# Patient Record
Sex: MALE | Race: BLACK OR AFRICAN AMERICAN | Employment: FULL TIME | URBAN - METROPOLITAN AREA
[De-identification: names, ages, dates, MRNs, and addresses within clinical notes are randomized per-mention and may not be internally consistent; named-entity substitution may affect disease eponyms.]

---

## 2018-11-23 NOTE — PERIOP NOTES
PAT - SURGICAL PRE-ADMISSION INSTRUCTIONS 
 
NAME:  Nabila Goodson ELADIO Villa DATE:  11/23/2018 SURGERY DATE:  11/27/2018                                  SURGERY ARRIVAL TIME:   1000 1. Do NOT eat or drink anything, including candy or gum, after MIDNIGHT on 11/26/18 , unless you have specific instructions from your Surgeon or Anesthesia Provider to do so. 2. No smoking on the day of surgery. 3. No alcohol 24 hours prior to the day of surgery. 4. No recreational drugs for one week prior to the day of surgery. 5. Leave all valuables, including money/purse, at home. 6. Remove all jewelry, nail polish, makeup (including mascara); no lotions, powders, deodorant, or perfume/cologne/after shave. 7. Glasses/Contact lenses and Dentures may be worn to the hospital.  They will be removed prior to surgery. 8. Call your doctor if symptoms of a cold or illness develop within 24 ours prior to surgery. 9. AN ADULT MUST DRIVE YOU HOME AFTER OUTPATIENT SURGERY. 10. If you are having an OUTPATIENT procedure, please make arrangements for a responsible adult to be with you for 24 hours after your surgery. 11. If you are admitted to the hospital, you will be assigned to a bed after surgery is complete. Normally a family member will not be able to see you until you are in your assigned bed. 15. Family is encouraged to accompany you to the hospital.  We ask visitors in the treatment area to be limited to ONE person at a time to ensure patient privacy. EXCEPTIONS WILL BE MADE AS NEEDED. 15. Children under 12 are discouraged from entering the treatment area and need to be supervised by an adult when in the waiting room. Special Instructions: Take these medications the morning of surgery with a sip of water:  Losartan Patient Prep: 
 
shower with anti-bacterial soap These surgical instructions were reviewed with Nabila Goodson during the PAT phone call. Directions: On the morning of surgery, please go to the [facility]. Enter the building from the [facility] entrance, 1st floor (next to the Emergency Room entrance). Take the elevator to the 2nd floor. Sign in at the Registration Desk. If you have any questions and/or concerns, please do not hesitate to call: 
(Prior to the day of surgery)  [facility] unit:  [phone] (Day of surgery)  [facility] unit:  [phone]

## 2018-11-26 ENCOUNTER — ANESTHESIA EVENT (OUTPATIENT)
Dept: SURGERY | Age: 68
End: 2018-11-26
Payer: COMMERCIAL

## 2018-11-27 ENCOUNTER — HOSPITAL ENCOUNTER (OUTPATIENT)
Age: 68
Setting detail: OBSERVATION
Discharge: HOME | End: 2018-11-28
Attending: UROLOGY | Admitting: STUDENT IN AN ORGANIZED HEALTH CARE EDUCATION/TRAINING PROGRAM
Payer: COMMERCIAL

## 2018-11-27 ENCOUNTER — ANESTHESIA (OUTPATIENT)
Dept: SURGERY | Age: 68
End: 2018-11-27
Payer: COMMERCIAL

## 2018-11-27 DIAGNOSIS — N52.8 OTHER MALE ERECTILE DYSFUNCTION: Primary | ICD-10-CM

## 2018-11-27 PROCEDURE — 77030034850: Performed by: UROLOGY

## 2018-11-27 PROCEDURE — 77030018673: Performed by: UROLOGY

## 2018-11-27 PROCEDURE — 74011250636 HC RX REV CODE- 250/636: Performed by: UROLOGY

## 2018-11-27 PROCEDURE — 77030013079 HC BLNKT BAIR HGGR 3M -A: Performed by: NURSE ANESTHETIST, CERTIFIED REGISTERED

## 2018-11-27 PROCEDURE — 77030018823 HC SLV COMPR VENO -B: Performed by: UROLOGY

## 2018-11-27 PROCEDURE — 77030034696 HC CATH URETH FOL 2W BARD -A: Performed by: UROLOGY

## 2018-11-27 PROCEDURE — 74011250636 HC RX REV CODE- 250/636: Performed by: NURSE ANESTHETIST, CERTIFIED REGISTERED

## 2018-11-27 PROCEDURE — 77030020263 HC SOL INJ SOD CL0.9% LFCR 1000ML: Performed by: UROLOGY

## 2018-11-27 PROCEDURE — 74011250637 HC RX REV CODE- 250/637: Performed by: NURSE ANESTHETIST, CERTIFIED REGISTERED

## 2018-11-27 PROCEDURE — 77030010507 HC ADH SKN DERMBND J&J -B: Performed by: UROLOGY

## 2018-11-27 PROCEDURE — 74011000250 HC RX REV CODE- 250: Performed by: UROLOGY

## 2018-11-27 PROCEDURE — 77030002966 HC SUT PDS J&J -A: Performed by: UROLOGY

## 2018-11-27 PROCEDURE — C1813 PROSTHESIS, PENILE, INFLATAB: HCPCS | Performed by: UROLOGY

## 2018-11-27 PROCEDURE — 77030031139 HC SUT VCRL2 J&J -A: Performed by: UROLOGY

## 2018-11-27 PROCEDURE — 99218 HC RM OBSERVATION: CPT

## 2018-11-27 PROCEDURE — 77030014008 HC SPNG HEMSTAT J&J -C: Performed by: UROLOGY

## 2018-11-27 PROCEDURE — 77030012961 HC IRR KT CYSTO/TUR ICUM -A: Performed by: UROLOGY

## 2018-11-27 PROCEDURE — 76010000171 HC OR TIME 2 TO 2.5 HR INTENSV-TIER 1: Performed by: UROLOGY

## 2018-11-27 PROCEDURE — 74011250636 HC RX REV CODE- 250/636

## 2018-11-27 PROCEDURE — 77030018390 HC SPNG HEMSTAT2 J&J -B: Performed by: UROLOGY

## 2018-11-27 PROCEDURE — 77030011265 HC ELECTRD BLD HEX COVD -A: Performed by: UROLOGY

## 2018-11-27 PROCEDURE — 77030020229 HC RETRCTR SCROT SYS BSC -D: Performed by: UROLOGY

## 2018-11-27 PROCEDURE — 74011250637 HC RX REV CODE- 250/637: Performed by: STUDENT IN AN ORGANIZED HEALTH CARE EDUCATION/TRAINING PROGRAM

## 2018-11-27 PROCEDURE — 74011000250 HC RX REV CODE- 250

## 2018-11-27 PROCEDURE — 74011250636 HC RX REV CODE- 250/636: Performed by: STUDENT IN AN ORGANIZED HEALTH CARE EDUCATION/TRAINING PROGRAM

## 2018-11-27 PROCEDURE — 76210000016 HC OR PH I REC 1 TO 1.5 HR: Performed by: UROLOGY

## 2018-11-27 PROCEDURE — 74011000258 HC RX REV CODE- 258

## 2018-11-27 PROCEDURE — 74011000258 HC RX REV CODE- 258: Performed by: UROLOGY

## 2018-11-27 PROCEDURE — 77030012510 HC MSK AIRWY LMA TELE -B: Performed by: NURSE ANESTHETIST, CERTIFIED REGISTERED

## 2018-11-27 PROCEDURE — 76060000035 HC ANESTHESIA 2 TO 2.5 HR: Performed by: UROLOGY

## 2018-11-27 RX ORDER — MORPHINE SULFATE 4 MG/ML
2 INJECTION INTRAVENOUS
Status: DISCONTINUED | OUTPATIENT
Start: 2018-11-27 | End: 2018-11-28 | Stop reason: HOSPADM

## 2018-11-27 RX ORDER — MIDAZOLAM HYDROCHLORIDE 1 MG/ML
INJECTION, SOLUTION INTRAMUSCULAR; INTRAVENOUS AS NEEDED
Status: DISCONTINUED | OUTPATIENT
Start: 2018-11-27 | End: 2018-11-27 | Stop reason: HOSPADM

## 2018-11-27 RX ORDER — DIPHENHYDRAMINE HYDROCHLORIDE 50 MG/ML
12.5 INJECTION, SOLUTION INTRAMUSCULAR; INTRAVENOUS
Status: DISCONTINUED | OUTPATIENT
Start: 2018-11-27 | End: 2018-11-28 | Stop reason: HOSPADM

## 2018-11-27 RX ORDER — PROPOFOL 10 MG/ML
INJECTION, EMULSION INTRAVENOUS AS NEEDED
Status: DISCONTINUED | OUTPATIENT
Start: 2018-11-27 | End: 2018-11-27 | Stop reason: HOSPADM

## 2018-11-27 RX ORDER — LIDOCAINE HYDROCHLORIDE 20 MG/ML
INJECTION, SOLUTION EPIDURAL; INFILTRATION; INTRACAUDAL; PERINEURAL AS NEEDED
Status: DISCONTINUED | OUTPATIENT
Start: 2018-11-27 | End: 2018-11-27 | Stop reason: HOSPADM

## 2018-11-27 RX ORDER — NEOMYCIN AND POLYMYXIN B SULFATES 40; 200000 MG/ML; [USP'U]/ML
SOLUTION IRRIGATION AS NEEDED
Status: DISCONTINUED | OUTPATIENT
Start: 2018-11-27 | End: 2018-11-27 | Stop reason: HOSPADM

## 2018-11-27 RX ORDER — SODIUM CHLORIDE, SODIUM LACTATE, POTASSIUM CHLORIDE, CALCIUM CHLORIDE 600; 310; 30; 20 MG/100ML; MG/100ML; MG/100ML; MG/100ML
25 INJECTION, SOLUTION INTRAVENOUS CONTINUOUS
Status: DISCONTINUED | OUTPATIENT
Start: 2018-11-27 | End: 2018-11-27 | Stop reason: HOSPADM

## 2018-11-27 RX ORDER — SODIUM CHLORIDE, SODIUM LACTATE, POTASSIUM CHLORIDE, CALCIUM CHLORIDE 600; 310; 30; 20 MG/100ML; MG/100ML; MG/100ML; MG/100ML
INJECTION, SOLUTION INTRAVENOUS
Status: DISCONTINUED | OUTPATIENT
Start: 2018-11-27 | End: 2018-11-27 | Stop reason: HOSPADM

## 2018-11-27 RX ORDER — SODIUM CHLORIDE 0.9 % (FLUSH) 0.9 %
5-10 SYRINGE (ML) INJECTION EVERY 8 HOURS
Status: DISCONTINUED | OUTPATIENT
Start: 2018-11-27 | End: 2018-11-28 | Stop reason: HOSPADM

## 2018-11-27 RX ORDER — SULFAMETHOXAZOLE AND TRIMETHOPRIM 800; 160 MG/1; MG/1
1 TABLET ORAL 2 TIMES DAILY
Qty: 20 TAB | Refills: 0 | Status: SHIPPED | OUTPATIENT
Start: 2018-11-27 | End: 2018-11-27 | Stop reason: SDUPTHER

## 2018-11-27 RX ORDER — ATORVASTATIN CALCIUM 10 MG/1
40 TABLET, FILM COATED ORAL DAILY
Status: DISCONTINUED | OUTPATIENT
Start: 2018-11-28 | End: 2018-11-28 | Stop reason: HOSPADM

## 2018-11-27 RX ORDER — INSULIN LISPRO 100 [IU]/ML
INJECTION, SOLUTION INTRAVENOUS; SUBCUTANEOUS ONCE
Status: DISCONTINUED | OUTPATIENT
Start: 2018-11-27 | End: 2018-11-27 | Stop reason: HOSPADM

## 2018-11-27 RX ORDER — DEXAMETHASONE SODIUM PHOSPHATE 4 MG/ML
INJECTION, SOLUTION INTRA-ARTICULAR; INTRALESIONAL; INTRAMUSCULAR; INTRAVENOUS; SOFT TISSUE AS NEEDED
Status: DISCONTINUED | OUTPATIENT
Start: 2018-11-27 | End: 2018-11-27 | Stop reason: HOSPADM

## 2018-11-27 RX ORDER — VANCOMYCIN 2 GRAM/500 ML IN 0.9 % SODIUM CHLORIDE INTRAVENOUS
2000 ONCE
Status: COMPLETED | OUTPATIENT
Start: 2018-11-27 | End: 2018-11-27

## 2018-11-27 RX ORDER — SULFAMETHOXAZOLE AND TRIMETHOPRIM 800; 160 MG/1; MG/1
1 TABLET ORAL 2 TIMES DAILY
Qty: 20 TAB | Refills: 0 | Status: SHIPPED | OUTPATIENT
Start: 2018-11-27 | End: 2018-12-05

## 2018-11-27 RX ORDER — DEXTROSE MONOHYDRATE 25 G/50ML
25-50 INJECTION, SOLUTION INTRAVENOUS AS NEEDED
Status: DISCONTINUED | OUTPATIENT
Start: 2018-11-27 | End: 2018-11-27 | Stop reason: HOSPADM

## 2018-11-27 RX ORDER — HEPARIN SODIUM 5000 [USP'U]/ML
5000 INJECTION, SOLUTION INTRAVENOUS; SUBCUTANEOUS EVERY 8 HOURS
Status: DISCONTINUED | OUTPATIENT
Start: 2018-11-27 | End: 2018-11-28 | Stop reason: HOSPADM

## 2018-11-27 RX ORDER — OXYCODONE AND ACETAMINOPHEN 5; 325 MG/1; MG/1
1 TABLET ORAL
Status: DISCONTINUED | OUTPATIENT
Start: 2018-11-27 | End: 2018-11-28 | Stop reason: HOSPADM

## 2018-11-27 RX ORDER — ONDANSETRON 2 MG/ML
INJECTION INTRAMUSCULAR; INTRAVENOUS AS NEEDED
Status: DISCONTINUED | OUTPATIENT
Start: 2018-11-27 | End: 2018-11-27 | Stop reason: HOSPADM

## 2018-11-27 RX ORDER — SODIUM CHLORIDE 0.9 % (FLUSH) 0.9 %
5-10 SYRINGE (ML) INJECTION AS NEEDED
Status: DISCONTINUED | OUTPATIENT
Start: 2018-11-27 | End: 2018-11-27 | Stop reason: HOSPADM

## 2018-11-27 RX ORDER — SODIUM CHLORIDE 0.9 % (FLUSH) 0.9 %
5-10 SYRINGE (ML) INJECTION AS NEEDED
Status: DISCONTINUED | OUTPATIENT
Start: 2018-11-27 | End: 2018-11-28 | Stop reason: HOSPADM

## 2018-11-27 RX ORDER — SODIUM CHLORIDE, SODIUM LACTATE, POTASSIUM CHLORIDE, CALCIUM CHLORIDE 600; 310; 30; 20 MG/100ML; MG/100ML; MG/100ML; MG/100ML
50 INJECTION, SOLUTION INTRAVENOUS CONTINUOUS
Status: DISCONTINUED | OUTPATIENT
Start: 2018-11-27 | End: 2018-11-27 | Stop reason: HOSPADM

## 2018-11-27 RX ORDER — LABETALOL HCL 20 MG/4 ML
5 SYRINGE (ML) INTRAVENOUS
Status: DISCONTINUED | OUTPATIENT
Start: 2018-11-27 | End: 2018-11-27 | Stop reason: HOSPADM

## 2018-11-27 RX ORDER — DOCUSATE SODIUM 100 MG/1
100 CAPSULE, LIQUID FILLED ORAL 2 TIMES DAILY
Status: DISCONTINUED | OUTPATIENT
Start: 2018-11-27 | End: 2018-11-28 | Stop reason: HOSPADM

## 2018-11-27 RX ORDER — FENTANYL CITRATE 50 UG/ML
INJECTION, SOLUTION INTRAMUSCULAR; INTRAVENOUS AS NEEDED
Status: DISCONTINUED | OUTPATIENT
Start: 2018-11-27 | End: 2018-11-27 | Stop reason: HOSPADM

## 2018-11-27 RX ORDER — OXYCODONE AND ACETAMINOPHEN 5; 325 MG/1; MG/1
1 TABLET ORAL
Qty: 16 TAB | Refills: 0 | Status: SHIPPED | OUTPATIENT
Start: 2018-11-27 | End: 2018-12-05

## 2018-11-27 RX ORDER — OXYCODONE AND ACETAMINOPHEN 5; 325 MG/1; MG/1
1-2 TABLET ORAL
Status: DISCONTINUED | OUTPATIENT
Start: 2018-11-27 | End: 2018-11-27 | Stop reason: HOSPADM

## 2018-11-27 RX ORDER — ONDANSETRON 2 MG/ML
4 INJECTION INTRAMUSCULAR; INTRAVENOUS
Status: DISCONTINUED | OUTPATIENT
Start: 2018-11-27 | End: 2018-11-28 | Stop reason: HOSPADM

## 2018-11-27 RX ORDER — EPHEDRINE SULFATE 50 MG/ML
INJECTION, SOLUTION INTRAVENOUS AS NEEDED
Status: DISCONTINUED | OUTPATIENT
Start: 2018-11-27 | End: 2018-11-27 | Stop reason: HOSPADM

## 2018-11-27 RX ORDER — FAMOTIDINE 20 MG/1
20 TABLET, FILM COATED ORAL ONCE
Status: COMPLETED | OUTPATIENT
Start: 2018-11-27 | End: 2018-11-27

## 2018-11-27 RX ORDER — ONDANSETRON 2 MG/ML
4 INJECTION INTRAMUSCULAR; INTRAVENOUS
Status: DISCONTINUED | OUTPATIENT
Start: 2018-11-27 | End: 2018-11-27 | Stop reason: HOSPADM

## 2018-11-27 RX ORDER — LOSARTAN POTASSIUM 50 MG/1
100 TABLET ORAL DAILY
Status: DISCONTINUED | OUTPATIENT
Start: 2018-11-28 | End: 2018-11-28 | Stop reason: HOSPADM

## 2018-11-27 RX ORDER — FENTANYL CITRATE 50 UG/ML
50 INJECTION, SOLUTION INTRAMUSCULAR; INTRAVENOUS
Status: COMPLETED | OUTPATIENT
Start: 2018-11-27 | End: 2018-11-27

## 2018-11-27 RX ORDER — MORPHINE SULFATE 4 MG/ML
2-4 INJECTION INTRAVENOUS
Status: DISCONTINUED | OUTPATIENT
Start: 2018-11-27 | End: 2018-11-27 | Stop reason: HOSPADM

## 2018-11-27 RX ORDER — DOCUSATE SODIUM 100 MG/1
100 CAPSULE, LIQUID FILLED ORAL 2 TIMES DAILY
Qty: 40 CAP | Refills: 0 | Status: SHIPPED | OUTPATIENT
Start: 2018-11-27 | End: 2018-12-05

## 2018-11-27 RX ORDER — MAGNESIUM SULFATE 100 %
4 CRYSTALS MISCELLANEOUS AS NEEDED
Status: DISCONTINUED | OUTPATIENT
Start: 2018-11-27 | End: 2018-11-27 | Stop reason: HOSPADM

## 2018-11-27 RX ORDER — VANCOMYCIN/0.9 % SOD CHLORIDE 1.5G/250ML
1500 PLASTIC BAG, INJECTION (ML) INTRAVENOUS EVERY 12 HOURS
Status: DISCONTINUED | OUTPATIENT
Start: 2018-11-28 | End: 2018-11-28 | Stop reason: HOSPADM

## 2018-11-27 RX ADMIN — FENTANYL CITRATE 25 MCG: 50 INJECTION, SOLUTION INTRAMUSCULAR; INTRAVENOUS at 13:48

## 2018-11-27 RX ADMIN — Medication 10 ML: at 16:09

## 2018-11-27 RX ADMIN — PROPOFOL 40 MG: 10 INJECTION, EMULSION INTRAVENOUS at 13:48

## 2018-11-27 RX ADMIN — FENTANYL CITRATE 25 MCG: 50 INJECTION, SOLUTION INTRAMUSCULAR; INTRAVENOUS at 13:46

## 2018-11-27 RX ADMIN — SODIUM CHLORIDE, SODIUM LACTATE, POTASSIUM CHLORIDE, CALCIUM CHLORIDE: 600; 310; 30; 20 INJECTION, SOLUTION INTRAVENOUS at 13:23

## 2018-11-27 RX ADMIN — EPHEDRINE SULFATE 5 MG: 50 INJECTION, SOLUTION INTRAVENOUS at 14:06

## 2018-11-27 RX ADMIN — SODIUM CHLORIDE, SODIUM LACTATE, POTASSIUM CHLORIDE, AND CALCIUM CHLORIDE 50 ML/HR: 600; 310; 30; 20 INJECTION, SOLUTION INTRAVENOUS at 10:45

## 2018-11-27 RX ADMIN — PROPOFOL 50 MG: 10 INJECTION, EMULSION INTRAVENOUS at 13:22

## 2018-11-27 RX ADMIN — VANCOMYCIN HYDROCHLORIDE 2000 MG: 10 INJECTION, POWDER, LYOPHILIZED, FOR SOLUTION INTRAVENOUS at 12:25

## 2018-11-27 RX ADMIN — FENTANYL CITRATE 100 MCG: 50 INJECTION, SOLUTION INTRAMUSCULAR; INTRAVENOUS at 13:19

## 2018-11-27 RX ADMIN — LIDOCAINE HYDROCHLORIDE 50 MG: 20 INJECTION, SOLUTION EPIDURAL; INFILTRATION; INTRACAUDAL; PERINEURAL at 13:19

## 2018-11-27 RX ADMIN — HEPARIN SODIUM 5000 UNITS: 5000 INJECTION INTRAVENOUS; SUBCUTANEOUS at 16:11

## 2018-11-27 RX ADMIN — FENTANYL CITRATE 50 MCG: 50 INJECTION, SOLUTION INTRAMUSCULAR; INTRAVENOUS at 15:55

## 2018-11-27 RX ADMIN — DEXAMETHASONE SODIUM PHOSPHATE 8 MG: 4 INJECTION, SOLUTION INTRA-ARTICULAR; INTRALESIONAL; INTRAMUSCULAR; INTRAVENOUS; SOFT TISSUE at 13:34

## 2018-11-27 RX ADMIN — FAMOTIDINE 20 MG: 20 TABLET ORAL at 10:22

## 2018-11-27 RX ADMIN — DOCUSATE SODIUM 100 MG: 100 CAPSULE, LIQUID FILLED ORAL at 18:08

## 2018-11-27 RX ADMIN — PROPOFOL 150 MG: 10 INJECTION, EMULSION INTRAVENOUS at 13:21

## 2018-11-27 RX ADMIN — ONDANSETRON 4 MG: 2 INJECTION INTRAMUSCULAR; INTRAVENOUS at 15:05

## 2018-11-27 RX ADMIN — FENTANYL CITRATE 50 MCG: 50 INJECTION, SOLUTION INTRAMUSCULAR; INTRAVENOUS at 13:58

## 2018-11-27 RX ADMIN — MIDAZOLAM HYDROCHLORIDE 2 MG: 1 INJECTION, SOLUTION INTRAMUSCULAR; INTRAVENOUS at 13:13

## 2018-11-27 RX ADMIN — TOBRAMYCIN SULFATE 388 MG: 40 INJECTION, SOLUTION INTRAMUSCULAR; INTRAVENOUS at 13:27

## 2018-11-27 RX ADMIN — FENTANYL CITRATE 50 MCG: 50 INJECTION, SOLUTION INTRAMUSCULAR; INTRAVENOUS at 15:45

## 2018-11-27 NOTE — OP NOTES
Operative Note    11/27/2018    Patient: Terry Lara               Sex: Male             MRN: [id number]      YOB: 1950      Age: 79              Preoperative Diagnosis: Erectile dysfunction. Postoperative Diagnosis:  Erectile dysfunction. Surgeon: Eusebio Ingram MD    Assistant:  Cat Vazquez MD [address] [place] [address] Fellow    Diana Mirza MD RES PGY3    Anesthesia:     Indications for surgery: Patient is a 78 yo male with erectile dysfunction. He has failed conservative treatment for ED and now elects for penile prosthesis placement after discussion of the risks, and benefits of the procedure. Procedure performed:     1. Placement of [employer] Ambicor two-piece inflatable penile prosthesis    Findings:     1. [employer] Ambicor  2-piece penile prosthesis placement without complications. 2. We used a 20-cm device with a 3-cm rear-tip extender on the right and 3-cm rear-tip extender on the left. 3. The scrotal pump was placed in the midscrotum. Procedure in Detail:   The patient was brought back to the operating room and placed on the table in the supine position. He had bilateral sequential compression devices placed and received antibiotic preoperatively for antibiotic prophylaxis (Vancomycin 2000mg and 388mg Tobramycin). He was then placed under general endotracheal anesthesia and then prepped and draped in the usual sterile fashion. We did perform a standard 10-minute chlorhexidine and alcohol prep. A surgical time-out was performed identifying the correct patient and procedure and all were in agreement. A penoscrotal incision was made. Dissection was carried down through the subcutaneous tissues with Bovie cautery. The corpora cavernosa were identified bilaterally and cleared proximally down downwards the crura. The Murtaza retractor was then placed and used for retraction. We used a 2-0 PDSe stay sutures and then made a 2-cm corporotomy bilaterally.  We then dilated the corporal bodies with Lo dilators from 10-mm up to 14-mm proximally and distally without difficulty. We then measured the corpora using the standard sizer. With each corpora measured 23cm, we then chose the appropriate prosthesis with above the mentioned dimensions. The prosthesis opened at the back table and prepared in the standard fashion. All surgeons and surgical assists changed their gloves. The prosthesis was then passed to the operative field. We used the Spotsi with straight needle to place this out through the mid glands bilaterally. The balloons were then seated proximally and distally in the corporal bodies without buckling. We cycled the device using a syringe and this demonstrated a good cosmetic result. The corporotomies were then closed with the preplaced PDS sutures. Additional PDS suture was placed to ensure water tight repair of corporotomies and not puncturing the device. A subdartos pocket was created in the scrotum and the pump was placed within it. The tubing was then buried deep in the scrotum with several layers of dartos fascia approximated with 3-0 Vycril suture. We then closed the skin with a running 4-0 Monocryl. The patient was cleaned and dried. Dermabond was applied to the incision. A Kerlix mummy wrap was placed. At this point the case was ended. The patient was awoken from general anesthesia and transferred to the PACU in stable condition. He will be recovered in the PACU and then admitted for overnight observation    Estimated Blood Loss: 50ml             Implants: AMBICOR 20cm Cylinder and 3cm rear tip extenders. Specimens: None    Drains: None           Complications:  None           Counts: Sponge and needle counts were correct times two.     Plan: Patient has an appointment to see me in one week  for early postop follow-up, at [address] [place] [address]. Mari Tran, [address] [facility]    I performed the above procedure  Lduivina Will MD

## 2018-11-27 NOTE — ANESTHESIA POSTPROCEDURE EVALUATION
Procedure(s): 
possible cystoscopy, ambicor inflatable PENILE PROSTHESIS placement. Anesthesia Post Evaluation Multimodal analgesia: multimodal analgesia used between 6 hours prior to anesthesia start to PACU discharge Patient location during evaluation: PACU Patient participation: complete - patient participated Level of consciousness: awake Pain management: satisfactory to patient Airway patency: patent Anesthetic complications: no 
Cardiovascular status: acceptable Respiratory status: acceptable Hydration status: acceptable Visit Vitals BP (!) 171/95 Pulse 71 Temp 36.2 °C (97.2 °F) Resp 19 Ht 6' (1.829 m) Wt 106.8 kg (235 lb 8 oz) SpO2 99% BMI 31.94 kg/m²

## 2018-11-27 NOTE — PROGRESS NOTES
Chart reviewed and patient verified demographic sheet. He lives with his wife Elida Flowers [phone] and she will drive and participate in dc process. He is independennt with [employer] and uses no DME. Reason for Admission:   Penile surgery for erectile dysfunction RRAT Score:     none Plan for utilizing home health:    None at this time Likelihood of Readmission:  Low, this is scheduled proceedure Transition of Care Plan:    Home Patient and/or next of kin has been given and has signed the [facility] Outpatient Observation  Notification letter and all questions answered. Copy of this notice given to patient and copy placed on chart. Patient and/or next of kin has been given the Outpatient Observation Information and Notification letter and all questions answered.

## 2018-11-27 NOTE — ROUTINE PROCESS
Patient arrived from pacu by stretcher. Able to transfer self to hospital bed without difficulty. Rubio to SD. Dressing to penis c/d/i   Family at bedside. Patient tolerating liquids.

## 2018-11-27 NOTE — H&P
PREOPERATIVE HISTORY AND PHYSICAL EXAMINATION 
  
Subjective:  
  
Mr. Pepito Bautista, a 79 y.o. male, for preop history and physical exam. 
  
Planned surgery: Ambicor IPP placement. Location: [facility] Admission date: 11/27/2018 Surgery date: 11/27/2018 Admitting physician: Dr. Kayla Sharif Admitting diagnosis: ED Allergies: No Known Allergies 
  
Past medical history:  
    
Past Medical History:  
Diagnosis Date  Erectile dysfunction    
 Hypercholesterolemia    
 Hypertension    
  
Past surgical history:  
     
Past Surgical History:  
Procedure Laterality Date  HX HERNIA REPAIR      
  
Social history:  
Social History  
  
  
     
Socioeconomic History  Marital status:   
    Spouse name: Not on file  Number of children: Not on file  Years of education: Not on file  Highest education level: Not on file Tobacco Use  Smoking status: Never Smoker  Smokeless tobacco: Never Used Substance and Sexual Activity  Alcohol use: No  
 Drug use: No  
 Sexual activity: Not Currently  
  
  
Family history: History reviewed. No pertinent family history. 
  
Current medications:  
      
Current Outpatient Medications Medication Sig Dispense Refill  cholecalciferol (VITAMIN D3) 1,000 unit tablet Take  by mouth daily.      
 losartan (COZAAR) 100 mg tablet Take 100 mg by mouth daily.   0  
 omega 3-dha-epa-fish oil (FISH OIL) 100-160-1,000 mg cap Take  by mouth.      
 atorvastatin (LIPITOR) 40 mg tablet Take  by mouth daily.      
  
  
Chief Complaint:  
    
Chief Complaint Patient presents with  Erectile Dysfunction  
    Patient presents today for a hospital workup for an IPP placement on 11/27/18.  
  
  
History of Present Illness: Pepito Bautista is a 79 y.o.  male with a 5 year history of ED. He has failed multiple medications, experiences pain with ICI, and is not interested in KANU.  He now desires a penile prosthesis and has been scheduled on 11/27/2018. He feels well since last seen. No significant changes in medical history since last seen. No voiding complaints. Good FOS. No bothersome frequency/urgency. No n/v/f/c. Consents to proceed as planned.  
  
  
Review of systems:  
Constitutional: Fever: No 
Skin: Rash: No 
HEENT: Hearing difficulty: No 
Eyes: Blurred vision: No 
Cardiovascular: Chest pain: No 
Respiratory: Shortness of breath: No 
Gastrointestinal: Nausea/vomiting: No 
Musculoskeletal: Back pain: No 
Neurological: Weakness: No 
Psychological: Memory loss: No 
Comments/additional findings:   
  
Objective:  
Physical examination: 
Visit Vitals /90 Ht 6' (1.829 m) Wt 220 lb (99.8 kg) BMI 29.84 kg/m²  
  
Constitutional: WDWN, Pleasant and appropriate affect, No acute distress. CV:  No peripheral swelling noted. Regular arrhythmia. Respiratory: No respiratory distress or difficulties. Breath sounds clear and equal bilaterally. Abdomen:  No abdominal tenderness.  Male:  Deferred Skin: No jaundice. Neuro/Psych:  Alert and oriented x 3. Affect appropriate. Lymphatic:   No enlarged supraclavicular lymph nodes.  
  
Assessment/Plan:  
Henrietta Tyler will be brought to the operating room. We will proceed with planned surgery. All questions were answered. The indications, options, risks and benefits of the procedure have been explained in great detail. The complications discussed are in no way limited to the content of this note. The patient understands, and requests to proceed with surgery. Consent has been obtained. 
  
CONSENT FOR PLACEMENT OF INFLATABLE PENILE PROSTHESIS, CYSTOSCOPY 
  
The risks, benefits and alternatives to surgical intervention were discussed at length with the patient.   Alternatives include no intervention, use of oral medications such as sildenafil (Viagra), injection of medications into the penis to produce an erection, use of the vacuum erection device or implantation of a penile prosthesis. The patient is most interested in a prosthesis so this was reviewed in the greatest detail. The types of prosthesis including malleable rods, 2 piece and 3 piece prostheses were reviewed previously with the patient. The intended benefit of penile prosthesis placement is to allow the patient to use the device to create an artificial erection and resume relatively normal sexual activity. Risks of the procedure include, but are not limited to, bleeding, wound infection, and injury to surrounding structures during the procedure. If the urethra is injured during implantation of the device, the procedure may have to be aborted before completing device placement to allow the injury to heal.  Wound infection is one of the most concerning complications as an infection around the prosthesis would likely require removal of the prosthesis to allow the infection to completely clear up, resulting in ongoing inability to have erections. Some, but not all, of the available prosthetic devices have an antibiotic coating which can reduce the risk of infection around the device. We discussed that the prosthesis is a mechanical device and will eventually wear out. If this occurs and the patient desires to continue with sexual activity, the device can be replaced but this will require a repeat trip to the operating room. The patient expresses an understanding of these risks, benefits and alternatives, had all questions answered and requests that we proceed as planned with placement of an inflatable penile prosthesis in the MOR. 
  
  
Consent secured. Plan discussed. Preop urine c/s with Viridan growth [id number]/ml- preop ampicillin prescribed. Tobramycin and Vancomycin Preoperative antibiotic. May proceed with contemplated procedure. Codie Arteaga Fellow

## 2018-11-27 NOTE — PERIOP NOTES
1528 Pt received to PACU and connected to monitor. Bedside report given by. Vital signs stable. Nurse at bedside. Will continue to monitor. 555 [facility] Dr. Alka Kaur at bedside for catheter placement. 302 [facility] to update pt's family on current status and room assignment. 2901 TRANSFER - OUT REPORT: 
 
Verbal report given to Bam Sidhu  on Carine Rojas  being transferred to 2200 (unit) for routine post - op Report consisted of patients Situation, Background, Assessment and  
Recommendations(SBAR). Information from the following report(s) SBAR, Kardex and MAR was reviewed with the receiving nurse. Lines:  
Peripheral IV 11/27/18 Left Wrist (Active) Site Assessment Clean, dry, & intact 11/27/2018  3:28 PM  
Phlebitis Assessment 0 11/27/2018  3:28 PM  
Infiltration Assessment 0 11/27/2018  3:28 PM  
Dressing Status Clean, dry, & intact 11/27/2018  3:28 PM  
Dressing Type Transparent;Tape 11/27/2018  3:28 PM  
Hub Color/Line Status Pink;Capped; End cap changed 11/27/2018  3:28 PM  
Action Taken Open ports on tubing capped 11/27/2018  3:28 PM  
Alcohol Cap Used Yes 11/27/2018  3:28 PM  
   
Peripheral IV 11/27/18 Right Hand (Active) Site Assessment Clean, dry, & intact 11/27/2018  3:28 PM  
Phlebitis Assessment 0 11/27/2018  3:28 PM  
Infiltration Assessment 0 11/27/2018  3:28 PM  
Dressing Status Clean, dry, & intact 11/27/2018  3:28 PM  
Dressing Type Transparent;Tape 11/27/2018  3:28 PM  
Hub Color/Line Status Pink; Infusing 11/27/2018  3:28 PM  
Action Taken Open ports on tubing capped 11/27/2018  3:28 PM  
Alcohol Cap Used Yes 11/27/2018  3:28 PM  
  
 
Opportunity for questions and clarification was provided. Patient transported with: 
 [employer]

## 2018-11-27 NOTE — ROUTINE PROCESS
TRANSFER - IN REPORT: 
 
Verbal report received from reta(name) on Henrietta Tyler  being received from pacu(unit) for routine post - op Report consisted of patients Situation, Background, Assessment and  
Recommendations(SBAR). Information from the following report(s) SBAR was reviewed with the receiving nurse. Opportunity for questions and clarification was provided.

## 2018-11-27 NOTE — ANESTHESIA PREPROCEDURE EVALUATION
Anesthetic History No history of anesthetic complications Review of Systems / Medical History Patient summary reviewed, nursing notes reviewed and pertinent labs reviewed Pulmonary Within defined limits Undiagnosed apnea Neuro/Psych Within defined limits Cardiovascular Hypertension: poorly controlled Exercise tolerance: >4 METS 
  
GI/Hepatic/Renal 
Within defined limits Endo/Other Within defined limits Other Findings Physical Exam 
 
Airway Mallampati: III 
TM Distance: 4 - 6 cm Neck ROM: normal range of motion Mouth opening: Normal 
 
 Cardiovascular Regular rate and rhythm,  S1 and S2 normal,  no murmur, click, rub, or gallop Rhythm: regular Rate: normal 
 
 
 
 Dental 
No notable dental hx Pulmonary Breath sounds clear to auscultation Abdominal 
GI exam deferred Other Findings Anesthetic Plan ASA: 3 Anesthesia type: general 
 
 
 
 
Induction: Intravenous Anesthetic plan and risks discussed with: Patient

## 2018-11-27 NOTE — PROGRESS NOTES
conducted an initial consultation and Spiritual Assessment for Gloria Ellsworth, who is a 79 y.o.,male. Patients Primary Language is: [place]. According to the patients EMR Religion Affiliation is: No preference. The reason the Patient came to the hospital is:  
Patient Active Problem List  
 Diagnosis Date Noted  Erectile dysfunction  Hypertension The  provided the following Interventions: 
Initiated a relationship of care and support. Explored issues of deidre, spirituality and/or Anabaptism needs while hospitalized. Listened empathically. Provided chaplaincy education. Provided information about Spiritual Care Services. Offered prayer and assurance of continued prayers on patient's behalf. Chart reviewed. The following outcomes were achieved: 
Patient shared some information about their medical narrative and spiritual journey/beliefs. Patient processed feeling about current hospitalization. Patient expressed gratitude for the 's visit. Assessment: 
Patient did not indicate any spiritual or Anabaptism issues which require Spiritual Care Services interventions at this time. Patient does not have any Anabaptism/cultural needs that will affect patients preferences in health care. Plan: 
Chaplains will continue to follow and will provide pastoral care on an as needed or requested basis.  recommends bedside caregivers page  on duty if patient shows signs of acute spiritual or emotional distress. [facility]  Spiritual Care  
[phone]

## 2018-11-28 VITALS
HEART RATE: 91 BPM | TEMPERATURE: 98.6 F | HEIGHT: 72 IN | WEIGHT: 235.5 LBS | RESPIRATION RATE: 18 BRPM | DIASTOLIC BLOOD PRESSURE: 83 MMHG | BODY MASS INDEX: 31.9 KG/M2 | OXYGEN SATURATION: 93 % | SYSTOLIC BLOOD PRESSURE: 142 MMHG

## 2018-11-28 LAB
ANION GAP SERPL CALC-SCNC: 6 MMOL/L (ref 3–18)
BASOPHILS # BLD: 0 K/UL (ref 0–0.1)
BASOPHILS NFR BLD: 0 % (ref 0–2)
BUN SERPL-MCNC: 20 MG/DL (ref 7–18)
BUN/CREAT SERPL: 20 (ref 12–20)
CALCIUM SERPL-MCNC: 8.5 MG/DL (ref 8.5–10.1)
CHLORIDE SERPL-SCNC: 101 MMOL/L (ref 100–108)
CO2 SERPL-SCNC: 31 MMOL/L (ref 21–32)
CREAT SERPL-MCNC: 1.01 MG/DL (ref 0.6–1.3)
DIFFERENTIAL METHOD BLD: ABNORMAL
EOSINOPHIL # BLD: 0 K/UL (ref 0–0.4)
EOSINOPHIL NFR BLD: 0 % (ref 0–5)
ERYTHROCYTE [DISTWIDTH] IN BLOOD BY AUTOMATED COUNT: 13.2 % (ref 11.6–14.5)
GLUCOSE SERPL-MCNC: 128 MG/DL (ref 74–99)
HCT VFR BLD AUTO: 38.1 % (ref 36–48)
HGB BLD-MCNC: 13 G/DL (ref 13–16)
LYMPHOCYTES # BLD: 0.9 K/UL (ref 0.9–3.6)
LYMPHOCYTES NFR BLD: 9 % (ref 21–52)
MCH RBC QN AUTO: 26.8 PG (ref 24–34)
MCHC RBC AUTO-ENTMCNC: 34.1 G/DL (ref 31–37)
MCV RBC AUTO: 78.6 FL (ref 74–97)
MONOCYTES # BLD: 1 K/UL (ref 0.05–1.2)
MONOCYTES NFR BLD: 10 % (ref 3–10)
NEUTS SEG # BLD: 8.2 K/UL (ref 1.8–8)
NEUTS SEG NFR BLD: 81 % (ref 40–73)
PLATELET # BLD AUTO: 254 K/UL (ref 135–420)
PMV BLD AUTO: 10.6 FL (ref 9.2–11.8)
POTASSIUM SERPL-SCNC: 4.2 MMOL/L (ref 3.5–5.5)
RBC # BLD AUTO: 4.85 M/UL (ref 4.7–5.5)
SODIUM SERPL-SCNC: 138 MMOL/L (ref 136–145)
WBC # BLD AUTO: 10 K/UL (ref 4.6–13.2)

## 2018-11-28 PROCEDURE — 74011250636 HC RX REV CODE- 250/636: Performed by: UROLOGY

## 2018-11-28 PROCEDURE — 85025 COMPLETE CBC W/AUTO DIFF WBC: CPT

## 2018-11-28 PROCEDURE — 80048 BASIC METABOLIC PNL TOTAL CA: CPT

## 2018-11-28 PROCEDURE — 36415 COLL VENOUS BLD VENIPUNCTURE: CPT

## 2018-11-28 PROCEDURE — 51798 US URINE CAPACITY MEASURE: CPT

## 2018-11-28 PROCEDURE — 99218 HC RM OBSERVATION: CPT

## 2018-11-28 PROCEDURE — 74011250636 HC RX REV CODE- 250/636: Performed by: STUDENT IN AN ORGANIZED HEALTH CARE EDUCATION/TRAINING PROGRAM

## 2018-11-28 PROCEDURE — 74011250637 HC RX REV CODE- 250/637: Performed by: STUDENT IN AN ORGANIZED HEALTH CARE EDUCATION/TRAINING PROGRAM

## 2018-11-28 RX ADMIN — HEPARIN SODIUM 5000 UNITS: 5000 INJECTION INTRAVENOUS; SUBCUTANEOUS at 08:25

## 2018-11-28 RX ADMIN — ATORVASTATIN CALCIUM 40 MG: 10 TABLET, FILM COATED ORAL at 08:26

## 2018-11-28 RX ADMIN — LOSARTAN POTASSIUM 100 MG: 50 TABLET ORAL at 08:26

## 2018-11-28 RX ADMIN — HEPARIN SODIUM 5000 UNITS: 5000 INJECTION INTRAVENOUS; SUBCUTANEOUS at 00:09

## 2018-11-28 RX ADMIN — OXYCODONE AND ACETAMINOPHEN 1 TABLET: 5; 325 TABLET ORAL at 00:09

## 2018-11-28 RX ADMIN — VANCOMYCIN HYDROCHLORIDE 1500 MG: 10 INJECTION, POWDER, LYOPHILIZED, FOR SOLUTION INTRAVENOUS at 03:29

## 2018-11-28 RX ADMIN — DOCUSATE SODIUM 100 MG: 100 CAPSULE, LIQUID FILLED ORAL at 08:26

## 2018-11-28 RX ADMIN — Medication 10 ML: at 00:09

## 2018-11-28 NOTE — PROGRESS NOTES
Problem: Falls - Risk of 
Goal: *Absence of Falls Document Sean Albright Fall Risk and appropriate interventions in the flowsheet. Outcome: Progressing Towards Goal 
Fall Risk Interventions: 
  
 
  
 
Medication Interventions: Patient to call before getting OOB Elimination Interventions: Urinal in reach History of Falls Interventions: Door open when patient unattended

## 2018-11-28 NOTE — PROGRESS NOTES
Bedside shift change report given to Yo Dumont RN (oncoming nurse) by Amaya Mathis RN (offgoing nurse). Report included the following information SBAR, Kardex and MAR.  
 
2773: Tele box orders for continious pulse ox. Per Tele tech there are no pulse ox cords. Paged Dr. Pemberton Post for further orders. 2220: Dr. Orlean Lefort returned page and stated that he does not know why the patient requires continuous pulse ox. He informed me that I need to wait until tomorrow morning after 0800 to inform Dr. Pemberton Post that pulse ox is unavailable. 2212: Patient's alegre catheter was removed without issue. The dressing was also removed. Patient tolerated it well and is rest comfortably in bed in no apparent distress. Will continue to monitor. Bedside shift change report given to Amaya Mathis RN (oncoming nurse) by Yo Dumont RN (offgoing nurse). Report included the following information SBAR, Kardex and MAR.

## 2018-11-28 NOTE — DISCHARGE SUMMARY
Discharge Summary    Patient: Henrietta Tyler               Sex: male          DOA: 11/27/2018         YOB: 1950      Age:  79 y.o.        LOS:  LOS: 0 days                Admit Date: 11/27/2018    Discharge Date: 11/28/2018    Admission Diagnoses: n15.2  other male exetile dysfunction;Erectile dysfunction    Discharge Diagnoses:    Problem List as of 11/28/2018 Date Reviewed: 11/27/2018          Codes Class Noted - Resolved    Erectile dysfunction ICD-10-CM: N52.9  ICD-9-CM: 607.84  Unknown - Present        Hypertension ICD-10-CM: I10  ICD-9-CM: 401.9  Unknown - Present              Discharge Condition: Good    Hospital Course: The patient underwent IPP placement and did well postoperatively. Their diet was advanced as tolerated. Activity was gradually increased. Pain was well controlled. The patient was discharged home on POD 1 in stable condition. Consults: None    Significant Diagnostic Studies: none    Discharge Medications:     Current Discharge Medication List      START taking these medications    Details   oxyCODONE-acetaminophen (PERCOCET) 5-325 mg per tablet Take 1 Tab by mouth every six (6) hours as needed for Pain. Max Daily Amount: 4 Tabs. Qty: 16 Tab, Refills: 0      docusate sodium (COLACE) 100 mg capsule Take 1 Cap by mouth two (2) times a day for 20 days. Qty: 40 Cap, Refills: 0      trimethoprim-sulfamethoxazole (BACTRIM DS, SEPTRA DS) 160-800 mg per tablet Take 1 Tab by mouth two (2) times a day for 14 days. Qty: 20 Tab, Refills: 0         CONTINUE these medications which have NOT CHANGED    Details   cholecalciferol (VITAMIN D3) 1,000 unit tablet Take  by mouth daily. losartan (COZAAR) 100 mg tablet Take 100 mg by mouth daily. Refills: 0      omega 3-dha-epa-fish oil (FISH OIL) 100-160-1,000 mg cap Take  by mouth. atorvastatin (LIPITOR) 40 mg tablet Take  by mouth daily. Activity: No heavy lifting or strenuous activity for 4 weeks.      Diet: Regular    Wound Care: Keep wound clean and dry. Disposition: Home with self care    Follow-up: The patient will be seen in the clinic in 4 weeks.

## 2018-11-28 NOTE — PROGRESS NOTES
Care Management Interventions PCP Verified by CM: Yes(saw associate yesterday) Palliative Care Criteria Met (RRAT>21 & CHF Dx)?: No 
Mode of Transport at Discharge: Other (see comment)(wife) Transition of Care Consult (CM Consult): Discharge Planning MyChart Signup: No 
Discharge Durable Medical Equipment: No 
Physical Therapy Consult: No 
Occupational Therapy Consult: No 
Speech Therapy Consult: No 
Current Support Network: Lives with Spouse Confirm Follow Up Transport: Family Plan discussed with Pt/Family/Caregiver: Yes [employer] Information Provided?: No 
Discharge Location Discharge Placement: Home

## 2018-11-28 NOTE — PROGRESS NOTES
1730-Received pt in bed, A&Ox4 able to make needs known. Assessment completed. NAD. Call light in reach. 1800-Assisted to bathroom, tolerated well. 1940-Bedside and Verbal shift change report given to Jo Ann Last RN (oncoming nurse) by Haroon Perdomo RN (offgoing nurse). Report included the following information SBAR, Kardex, Intake/Output and MAR.

## 2018-11-28 NOTE — PROGRESS NOTES
Progress Note Patient: Dedrick Brittle MRN: [id number]  SSN: [id number] YOB: 1950  Age: 79 y.o. Sex: male Admit Date: 11/27/2018 LOS: 0 days Assessment:  
Pod 1 sp IPP Plan:  
 
 doing well. Dc home. Subjective:  
 
Feels well. Pain controlled. Objective:  
 
Vitals:  
 11/28/18 0006 11/28/18 0447 11/28/18 0746 11/28/18 1030 BP: 123/69 121/70 165/89 142/83 Pulse: 86 78 75 91 Resp: 18 18 16 18 Temp: 98.7 °F (37.1 °C) 98.5 °F (36.9 °C) 98.1 °F (36.7 °C) 98.6 °F (37 °C) SpO2: 92% 95% 95% 93% Weight:      
Height:      
  
 
Intake and Output: 
Current Shift: 11/28 0701 - 11/28 1900 In: -  
Out: 543 [CHUUZ:002] Last three shifts: 11/26 1901 - 11/28 0700 In: 1000 [I.V.:1000] Out: 5047 [Urine:2725] Current Facility-Administered Medications Medication Dose Route Frequency  sodium chloride (NS) flush 5-10 mL  5-10 mL IntraVENous Q8H  
 sodium chloride (NS) flush 5-10 mL  5-10 mL IntraVENous PRN  
 oxyCODONE-acetaminophen (PERCOCET) 5-325 mg per tablet 1 Tab  1 Tab Oral Q4H PRN  
 morphine injection 2 mg  2 mg IntraVENous Q4H PRN  
 ondansetron (ZOFRAN) injection 4 mg  4 mg IntraVENous Q4H PRN  
 diphenhydrAMINE (BENADRYL) injection 12.5 mg  12.5 mg IntraVENous Q4H PRN  
 docusate sodium (COLACE) capsule 100 mg  100 mg Oral BID  heparin (porcine) injection 5,000 Units  5,000 Units SubCUTAneous Q8H  
 atorvastatin (LIPITOR) tablet 40 mg  40 mg Oral DAILY  losartan (COZAAR) tablet 100 mg  100 mg Oral DAILY  tobramycin (NEBCIN) 388 mg in 0.9% sodium chloride 100 mL IVPB  5 mg/kg (Ideal) IntraVENous ONCE  
 vancomycin (VANCOCIN) 1500 mg in  ml infusion  1,500 mg IntraVENous Q12H Physical Exam:  
GENERAL: alert, cooperative, no distress, appears stated age LUNG: unlabored breathing ABDOMEN: soft, non-tender. No masses,  no organomegaly EXTREMITIES:  extremities normal, atraumatic, no cyanosis or edema SKIN: no jaundice :  
Testicles normal, no hematoma. Pump palpable, cylinders palpable. Lab/Data Review: 
BMP:  
Lab Results Component Value Date/Time  11/28/2018 05:20 AM  
 K 4.2 11/28/2018 05:20 AM  
  11/28/2018 05:20 AM  
 CO2 31 11/28/2018 05:20 AM  
 AGAP 6 11/28/2018 05:20 AM  
  (H) 11/28/2018 05:20 AM  
 BUN 20 (H) 11/28/2018 05:20 AM  
 CREA 1.01 11/28/2018 05:20 AM  
 GFRAA >60 11/28/2018 05:20 AM  
 GFRNA >60 11/28/2018 05:20 AM  
 
CBC:  
Lab Results Component Value Date/Time WBC 10.0 11/28/2018 05:20 AM  
 HGB 13.0 11/28/2018 05:20 AM  
 HCT 38.1 11/28/2018 05:20 AM  
  11/28/2018 05:20 AM  
 
COAGS: No results found for: APTT, PTP, INR  
 
 
CT Results: No results found for this or any previous visit. US Results: No results found for this or any previous visit. Active Problems: 
  Erectile dysfunction () Signed By: Dorthula Cooks, MD , FACS November 28, 2018 PAGER:  [phone] OFFICE:  [phone] [facility]

## 2018-11-28 NOTE — PROGRESS NOTES
7577 - Bedside and Verbal shift change report given to AZUCENA Hussein (oncoming nurse) by Rj Huggins RN (offgoing nurse). Report included the following information SBAR, Kardex, Intake/Output and MAR. Educated pt on need to void & PVR. Pt verbalized understanding. 1124 - pt voided 300 mL clear, yellow urine. 155 PVR.

## (undated) DEVICE — CATH URETH FOL 2W SH 14FRX5ML -- CONVERT TO ITEM 363071

## (undated) DEVICE — (D)PREP SKN CHLRAPRP APPL 26ML -- CONVERT TO ITEM 371833

## (undated) DEVICE — SYR 50ML LR LCK 1ML GRAD NSAF --

## (undated) DEVICE — SYR 10ML CTRL LR LCK NSAF LF --

## (undated) DEVICE — SPONGE GZ W4XL4IN COT 12 PLY TYP VII WVN C FLD DSGN

## (undated) DEVICE — SUTURE NONABSORBABLE SILK BRAID BLK PSL 2 0 30IN 486H

## (undated) DEVICE — SYRINGE MED 3ML NDL 22GA L1.5IN PLAS N CTRL LUERLOCK TIP

## (undated) DEVICE — Device

## (undated) DEVICE — MEDI-VAC SUCTION HANDLE REGULAR CAPACITY: Brand: CARDINAL HEALTH

## (undated) DEVICE — SPONGE HEMSTAT SURGCEL 2X4IN -- SURGIFOAM

## (undated) DEVICE — STERILE POLYISOPRENE POWDER-FREE SURGICAL GLOVES: Brand: PROTEXIS

## (undated) DEVICE — DEPAUL MAJOR PROCEDURE PACK: Brand: MEDLINE INDUSTRIES, INC.

## (undated) DEVICE — 3M™ IOBAN™ 2 ANTIMICROBIAL INCISE DRAPE 6650EZ: Brand: IOBAN™ 2

## (undated) DEVICE — DERMABOND SKIN ADH 0.7ML -- DERMABOND ADVANCED 12/BX

## (undated) DEVICE — SUTURE VCRL SZ 2-0 L27IN ABSRB UD L26MM SH 1/2 CIR J417H

## (undated) DEVICE — BANDAGE COMPR W4INXL5YD BGE COHESIVE SELF ADH ADBAN CBN1104] AVCOR HEALTHCARE PRODUCTS INC]

## (undated) DEVICE — REM POLYHESIVE ADULT PATIENT RETURN ELECTRODE: Brand: VALLEYLAB

## (undated) DEVICE — SYRINGE MED 3ML NDL 22GA L1 1/2IN REG BVL SFGLDE

## (undated) DEVICE — PLUG CATH CAP URETH FOL STRL --

## (undated) DEVICE — NDL PRT INJ NSAF BLNT 18GX1.5 --

## (undated) DEVICE — TABLE COVER: Brand: CONVERTORS

## (undated) DEVICE — SUTURE VCRL SZ 3-0 L27IN ABSRB UD L26MM SH 1/2 CIR J416H

## (undated) DEVICE — SKIN MARKER,REGULAR TIP WITH RULER AND LABELS: Brand: DEVON

## (undated) DEVICE — AGENT HEMSTAT W1XL2IN ABSRB SFT LTWT LAYERED ORC FIBRILLAR

## (undated) DEVICE — HEX-LOCKING BLADE ELECTRODE: Brand: EDGE

## (undated) DEVICE — TRAY CATH 16F URIN MTR LTX -- CONVERT TO ITEM 363111

## (undated) DEVICE — TOWEL SURG W16XL26IN BLU NONFENESTRATED DLX ST 2 PER PK

## (undated) DEVICE — SUTURE PDS II SZ 3-0 L27IN ABSRB VLT RB-1 L17MM 1/2 CIR Z305H

## (undated) DEVICE — TUBING IRRIG L77IN DIA0.241IN L BOR FOR CYSTO W/ NVENT

## (undated) DEVICE — SUTURE PDS II SZ 2-0 L27IN ABSRB VLT L26MM CT-2 1/2 CIR Z333H

## (undated) DEVICE — COVER LT HNDL BLU PLAS

## (undated) DEVICE — RAZOR PREP DBL EDGE STRL DISP --

## (undated) DEVICE — SOL INJ SOD CL 0.9% 250ML BG --

## (undated) DEVICE — BANDAGE,GAUZE,BULKEE II,4.5"X4.1YD,STRL: Brand: MEDLINE

## (undated) DEVICE — X-RAY SPONGES,12 PLY: Brand: DERMACEA